# Patient Record
Sex: MALE | Race: OTHER | Employment: UNEMPLOYED | URBAN - METROPOLITAN AREA
[De-identification: names, ages, dates, MRNs, and addresses within clinical notes are randomized per-mention and may not be internally consistent; named-entity substitution may affect disease eponyms.]

---

## 2024-02-23 ENCOUNTER — HOSPITAL ENCOUNTER (EMERGENCY)
Facility: HOSPITAL | Age: 29
Discharge: HOME | End: 2024-02-23
Attending: EMERGENCY MEDICINE
Payer: COMMERCIAL

## 2024-02-23 VITALS
HEART RATE: 77 BPM | SYSTOLIC BLOOD PRESSURE: 139 MMHG | RESPIRATION RATE: 16 BRPM | OXYGEN SATURATION: 96 % | TEMPERATURE: 97 F | WEIGHT: 220 LBS | DIASTOLIC BLOOD PRESSURE: 77 MMHG | BODY MASS INDEX: 30.8 KG/M2 | HEIGHT: 71 IN

## 2024-02-23 DIAGNOSIS — K52.9 GASTROENTERITIS: Primary | ICD-10-CM

## 2024-02-23 DIAGNOSIS — R10.13 DYSPEPSIA: ICD-10-CM

## 2024-02-23 LAB
ALBUMIN SERPL-MCNC: 5.7 G/DL (ref 3.2–4.8)
ALP LIVER SERPL-CCNC: 58 U/L
ALT SERPL-CCNC: 39 U/L
ANION GAP SERPL CALC-SCNC: 11 MMOL/L (ref 0–18)
AST SERPL-CCNC: 31 U/L (ref ?–34)
BASOPHILS # BLD AUTO: 0.03 X10(3) UL (ref 0–0.2)
BASOPHILS NFR BLD AUTO: 0.2 %
BILIRUB DIRECT SERPL-MCNC: 0.4 MG/DL (ref ?–0.3)
BILIRUB SERPL-MCNC: 1.7 MG/DL (ref 0.3–1.2)
BUN BLD-MCNC: 15 MG/DL (ref 9–23)
BUN/CREAT SERPL: 14.9 (ref 10–20)
CALCIUM BLD-MCNC: 10.4 MG/DL (ref 8.7–10.4)
CHLORIDE SERPL-SCNC: 107 MMOL/L (ref 98–112)
CO2 SERPL-SCNC: 24 MMOL/L (ref 21–32)
CREAT BLD-MCNC: 1.01 MG/DL
DEPRECATED RDW RBC AUTO: 37.2 FL (ref 35.1–46.3)
EGFRCR SERPLBLD CKD-EPI 2021: 103 ML/MIN/1.73M2 (ref 60–?)
EOSINOPHIL # BLD AUTO: 0.02 X10(3) UL (ref 0–0.7)
EOSINOPHIL NFR BLD AUTO: 0.1 %
ERYTHROCYTE [DISTWIDTH] IN BLOOD BY AUTOMATED COUNT: 12.2 % (ref 11–15)
GLUCOSE BLD-MCNC: 116 MG/DL (ref 70–99)
HCT VFR BLD AUTO: 49.7 %
HGB BLD-MCNC: 17.1 G/DL
IMM GRANULOCYTES # BLD AUTO: 0.07 X10(3) UL (ref 0–1)
IMM GRANULOCYTES NFR BLD: 0.5 %
LIPASE SERPL-CCNC: 31 U/L (ref 13–75)
LYMPHOCYTES # BLD AUTO: 0.59 X10(3) UL (ref 1–4)
LYMPHOCYTES NFR BLD AUTO: 4 %
MCH RBC QN AUTO: 28.9 PG (ref 26–34)
MCHC RBC AUTO-ENTMCNC: 34.4 G/DL (ref 31–37)
MCV RBC AUTO: 84 FL
MONOCYTES # BLD AUTO: 0.61 X10(3) UL (ref 0.1–1)
MONOCYTES NFR BLD AUTO: 4.2 %
NEUTROPHILS # BLD AUTO: 13.28 X10 (3) UL (ref 1.5–7.7)
NEUTROPHILS # BLD AUTO: 13.28 X10(3) UL (ref 1.5–7.7)
NEUTROPHILS NFR BLD AUTO: 91 %
OSMOLALITY SERPL CALC.SUM OF ELEC: 296 MOSM/KG (ref 275–295)
PLATELET # BLD AUTO: 293 10(3)UL (ref 150–450)
POTASSIUM SERPL-SCNC: 3.7 MMOL/L (ref 3.5–5.1)
PROT SERPL-MCNC: 9.5 G/DL (ref 5.7–8.2)
RBC # BLD AUTO: 5.92 X10(6)UL
SODIUM SERPL-SCNC: 142 MMOL/L (ref 136–145)
WBC # BLD AUTO: 14.6 X10(3) UL (ref 4–11)

## 2024-02-23 PROCEDURE — 83690 ASSAY OF LIPASE: CPT

## 2024-02-23 PROCEDURE — 80076 HEPATIC FUNCTION PANEL: CPT | Performed by: EMERGENCY MEDICINE

## 2024-02-23 PROCEDURE — 85025 COMPLETE CBC W/AUTO DIFF WBC: CPT | Performed by: EMERGENCY MEDICINE

## 2024-02-23 PROCEDURE — S0028 INJECTION, FAMOTIDINE, 20 MG: HCPCS | Performed by: EMERGENCY MEDICINE

## 2024-02-23 PROCEDURE — 80048 BASIC METABOLIC PNL TOTAL CA: CPT | Performed by: EMERGENCY MEDICINE

## 2024-02-23 PROCEDURE — 80048 BASIC METABOLIC PNL TOTAL CA: CPT

## 2024-02-23 PROCEDURE — 99284 EMERGENCY DEPT VISIT MOD MDM: CPT

## 2024-02-23 PROCEDURE — 85025 COMPLETE CBC W/AUTO DIFF WBC: CPT

## 2024-02-23 PROCEDURE — 96374 THER/PROPH/DIAG INJ IV PUSH: CPT

## 2024-02-23 PROCEDURE — 96361 HYDRATE IV INFUSION ADD-ON: CPT

## 2024-02-23 PROCEDURE — 83690 ASSAY OF LIPASE: CPT | Performed by: EMERGENCY MEDICINE

## 2024-02-23 RX ORDER — ONDANSETRON 4 MG/1
4 TABLET, ORALLY DISINTEGRATING ORAL EVERY 8 HOURS PRN
Qty: 6 TABLET | Refills: 0 | Status: SHIPPED | OUTPATIENT
Start: 2024-02-23

## 2024-02-23 RX ORDER — FAMOTIDINE 20 MG/1
20 TABLET, FILM COATED ORAL DAILY
Qty: 7 TABLET | Refills: 0 | Status: SHIPPED | OUTPATIENT
Start: 2024-02-23 | End: 2024-03-01

## 2024-02-23 RX ORDER — FAMOTIDINE 10 MG/ML
20 INJECTION, SOLUTION INTRAVENOUS ONCE
Status: COMPLETED | OUTPATIENT
Start: 2024-02-23 | End: 2024-02-23

## 2024-02-23 NOTE — ED PROVIDER NOTES
Patient Seen in: [facility] Emergency Department      History     Chief Complaint   Patient presents with    Nausea/Vomiting/Diarrhea    Abdomen/Flank Pain     Stated Complaint: Vomiting, Diarrhea    Subjective:   HPI    28-year-old male healthy woke up this morning with some abdominal discomfort.  He initially had some what he thought was constipation and then developed several episodes of diarrhea.  He went to work and he got nauseated and now has had some vomiting.  No fever.  No abdominal surgical history.  No sick contacts.  No recent travel.    Objective:   Past Medical History:   Diagnosis Date    Asthma (HCC)               History reviewed. No pertinent surgical history.             Social History     Socioeconomic History    Marital status: Single   Tobacco Use    Smoking status: Never    Smokeless tobacco: Never   Vaping Use    Vaping Use: Never used   Substance and Sexual Activity    Alcohol use: Not Currently    Drug use: Not Currently              Review of Systems    Positive for stated complaint: Vomiting, Diarrhea  Other systems are as noted in HPI.  Constitutional and vital signs reviewed.      All other systems reviewed and negative except as noted above.    Physical Exam     ED Triage Vitals [02/23/24 1527]   /83   Pulse 86   Resp 21   Temp 97 °F (36.1 °C)   Temp src Temporal   SpO2 97 %   O2 Device None (Room air)       Current:/77   Pulse 77   Temp 97 °F (36.1 °C) (Temporal)   Resp 16   Ht 180.3 cm (5' 11\")   Wt 99.8 kg   SpO2 96%   BMI 30.68 kg/m²         Physical Exam    Constitutional: Oriented to person, place, and time.  Appears well-developed. No distress.   Head: Normocephalic and atraumatic.   Eyes: Conjunctivae are normal. Pupils are equal, round, and reactive to light.   Cardiovascular: Normal rate, regular rhythm and intact distal pulses.    Pulmonary/Chest: Effort normal. No respiratory distress.   Abdominal: Soft.  Mild epigastric tenderness.  No guarding  or peritoneal signs.  Musculoskeletal: Normal range of motion. No edema or tenderness.   Neurological: Alert and oriented to person, place, and time.   Skin: Skin is warm and dry.   Nursing note and vitals reviewed.    Differential diagnosis includes viral gastroenteritis, dyspepsia, gastroenteritis, pancreatitis.      ED Course     Labs Reviewed   BASIC METABOLIC PANEL (8) - Abnormal; Notable for the following components:       Result Value    Glucose 116 (*)     Calculated Osmolality 296 (*)     All other components within normal limits   HEPATIC FUNCTION PANEL (7) - Abnormal; Notable for the following components:    Bilirubin, Total 1.7 (*)     Bilirubin, Direct 0.4 (*)     Total Protein 9.5 (*)     Albumin 5.7 (*)     All other components within normal limits   CBC W/ DIFFERENTIAL - Abnormal; Notable for the following components:    WBC 14.6 (*)     RBC 5.92 (*)     Neutrophil Absolute Prelim 13.28 (*)     Neutrophil Absolute 13.28 (*)     Lymphocyte Absolute 0.59 (*)     All other components within normal limits   LIPASE - Normal   CBC WITH DIFFERENTIAL WITH PLATELET    Narrative:     The following orders were created for panel order CBC With Differential With Platelet.  Procedure                               Abnormality         Status                     ---------                               -----------         ------                     CBC W/ DIFFERENTIAL[[id number]]          Abnormal            Final result                 Please view results for these tests on the individual orders.                      MDM                                         Medical Decision Making  Patient clinically feeling much improved.  Vital stable.  Nonspecific likely reactive but probably associated white count elevation regarding his viral gastroenteritis.  Will treat symptomatically.  Encourage fluids.  Tylenol Motrin at home verbjorje.  He will follow-up in [place] over the weekend with any worsening or change.    Problems  Addressed:  Dyspepsia: acute illness or injury  Gastroenteritis: acute illness or injury with systemic symptoms    Amount and/or Complexity of Data Reviewed  Labs: ordered. Decision-making details documented in ED Course.    Risk  OTC drugs.  Prescription drug management.        Disposition and Plan     Clinical Impression:  1. Gastroenteritis    2. Dyspepsia         Disposition:  Discharge  2/23/2024  6:31 pm    Follow-up:  YOUR PRIMARY CARE DOCTOR    Call in 3 day(s)  As needed          Medications Prescribed:  Discharge Medication List as of 2/23/2024  6:32 PM        START taking these medications    Details   famotidine (PEPCID) 20 MG Oral Tab Take 1 tablet (20 mg total) by mouth daily for 7 days., Normal, Disp-7 tablet, R-0      ondansetron 4 MG Oral Tablet Dispersible Take 1 tablet (4 mg total) by mouth every 8 (eight) hours as needed for Nausea., Normal, Disp-6 tablet, R-0

## 2024-02-24 NOTE — ED QUICK NOTES
Pt a/ox4, respirations unlabored, speech full/clear, gait steady, no acute distress. All ED orders completed.   Pt instructed to return to ED for any new/severe s/s or as directed by provider, and to see PMD/specialist ASAP or as directed by provider.

## 2024-02-26 NOTE — CM/SW NOTE
Called patient at [phone] spoke with Emely who stated patient is much better.    Called patients cell number [phone] to check on patient, message left     Epifanio called back stating he is much better and he will make a follow up appointment with his pcp

## 2025-02-02 NOTE — DISCHARGE INSTRUCTIONS
Take the muscle relaxer as needed for for the musculoskeletal back pain will cause drowsiness no driving or operating machinery while taking this medication.  Continue over-the-counter ibuprofen and Tylenol for pain.  Continue over-the-counter lidocaine patch or IcyHot.  You should receive a phone call from the spine center to set up an appointment you may also follow-up with primary care provider resource provided.  If you develop worsening back pain or pain location you did not initially have unintentionally urinate or have a bowel movement on yourself wake up cannot feel your lower extremities or ambulate or any new or worsening symptoms go to the nearest emergency department.

## 2025-02-02 NOTE — ED PROVIDER NOTES
Patient Seen in: [facility]      History     Chief Complaint   Patient presents with    Back Pain     Stated Complaint: Back Pain    Subjective:   HPI      This is a 29-year-old male with a history of asthma presenting with lower back pain.  Patient states since Thursday he has been dealing with lower back pain worse on Friday saw the chiropractor yesterday who did some cupping that seem to help but it still feels uncomfortable.  Patient states he thinks he might of hurt his self while lifting he does do some heavy lifting pushing and pulling for work but did not fall or land on his back.  Denies any urinary symptoms any bowel or bladder incontinence or saddle paresthesia.  Patient states he has had similar back pain like this several years ago in the past and it was just muscle.    Objective:     Past Medical History:    Asthma (HCC)              History reviewed. No pertinent surgical history.             Social History     Socioeconomic History    Marital status: Single   Tobacco Use    Smoking status: Never    Smokeless tobacco: Never   Vaping Use    Vaping status: Never Used   Substance and Sexual Activity    Alcohol use: Not Currently    Drug use: Not Currently              Review of Systems    Positive for stated complaint: Back Pain  Other systems are as noted in HPI.  Constitutional and vital signs reviewed.      All other systems reviewed and negative except as noted above.    Physical Exam     ED Triage Vitals [02/02/25 0842]   /68   Pulse 78   Resp 18   Temp 98.1 °F (36.7 °C)   Temp src Oral   SpO2 97 %   O2 Device None (Room air)       Current Vitals:   Vital Signs  BP: 128/68  Pulse: 78  Resp: 18  Temp: 98.1 °F (36.7 °C)  Temp src: Oral    Oxygen Therapy  SpO2: 97 %  O2 Device: None (Room air)        Physical Exam  Vitals and nursing note reviewed.   Constitutional:       Appearance: Normal appearance.   HENT:      Right Ear: External ear normal.      Left Ear: External ear normal.       Nose: Nose normal.      Mouth/Throat:      Mouth: Mucous membranes are moist.      Pharynx: Oropharynx is clear.   Eyes:      Conjunctiva/sclera: Conjunctivae normal.   Abdominal:      Palpations: Abdomen is soft.      Tenderness: There is no abdominal tenderness. There is no right CVA tenderness or left CVA tenderness.   Musculoskeletal:         General: Normal range of motion.      Cervical back: Normal range of motion.      Lumbar back: Negative right straight leg raise test and negative left straight leg raise test.        Back:       Comments: Cervical thoracic lumbar spine no midline TTP or step-offs   Skin:     General: Skin is warm.      Capillary Refill: Capillary refill takes less than 2 seconds.   Neurological:      General: No focal deficit present.      Mental Status: He is alert and oriented to person, place, and time.         ED Course   Labs Reviewed - No data to display  Final result by Lavelle Rizzo MD (02/02/25 09:55:10)                Impression:    CONCLUSION:  No fracture, dislocation or significant spondylotic changes.           Dictated by (CST): Lavelle Rizzo MD on 2/02/2025 at 9:54 AM      Finalized by (CST): Lavelle Rizzo MD on 2/02/2025 at 9:55 AM                  Narrative:    PROCEDURE: XR LUMBAR SPINE (MIN 2 VIEWS) (CPT=72100)     COMPARISON: None.     INDICATIONS: Acute right-sided lower back pain post lifting injury.  No reported sciatica.     TECHNIQUE: Lumbar spine radiographs (2-3 views)       FINDINGS:     ALIGNMENT: Normal alignment.    VERTEBRAL BODIES:   Normal.  No fracture or bony lesion.  DISC SPACES: Normal.  No significant disc space narrowing.  SACROILIAC JOINTS: Normal.    OTHER: Negative.                                        MDM           Medical Decision Making  29-year-old male well-appearing and nontoxic presenting with low back pain.  DDx lumbar strain versus acute on chronic low back pain versus low back pain with sciatica.  No clinical  indication for labs urine or advanced imaging but a lumbar spine x-ray will be ordered Toradol be given for pain and lidocaine patch will be applied anticipate home with referral to the spine center Flexeril and continue over-the-counter supportive therapy measures.  Patient is agreeable with the plan of care.    X-ray independently viewed by this provider no fracture noted    Discussed results with the patient.  Discussed likely lumbar strain or acute low back pain discussed treatment with Flexeril and side effects of this medication discussed over-the-counter NSAIDs to start after 6 PM as he did receive Toradol here in [place] may continue Tylenol and over-the-counter lidocaine patch or IcyHot or alternate heat or ice discussed strict ER precautions with any new or worsening symptoms outpatient follow-up with PCP and that the spine center should contact him to set up an appointment.  Patient feels comfortable with the plan of care and acknowledges understanding discharge instructions.    Problems Addressed:  Acute right-sided low back pain without sciatica: acute illness or injury    Amount and/or Complexity of Data Reviewed  Radiology: ordered and independent interpretation performed. Decision-making details documented in ED Course.    Risk  OTC drugs.  Prescription drug management.        Disposition and Plan     Clinical Impression:  1. Acute right-sided low back pain without sciatica         Disposition:  Discharge  2/2/2025  9:59 am    Follow-up:  Rey Sheridan MD  [address] [place] [address]  [phone]      Resource for primary care doctor to follow-up with call to set up an appointment          Medications Prescribed:  Discharge Medication List as of 2/2/2025  9:59 AM        START taking these medications    Details   cyclobenzaprine 10 MG Oral Tab Take 1 tablet (10 mg total) by mouth 3 (three) times daily as needed for Muscle spasms (May cause drowsiness no driving or operating  machinery while taking this medication)., Normal, Disp-10 tablet, R-0                 Supplementary Documentation:

## 2025-02-03 NOTE — PROGRESS NOTES
Spine Center Referral Navigation Encounter Note    Referred by: ANAHI Valdez    Imaging: XR Lumbar Spine   If imaging done at an external facility, instructed patient to bring disc of MRI to appointment.     Previously Seen Spine Care Providers: None    Referred to: Mark Duarte MD in Physiatry     Information below is patient reported.     [facility]  Are you currently experiencing any of the following symptoms?      No    Is your condition due to an injury that occurred at work or is your care for this condition being coordinated by Worker's Compensation?      No    Are you looking for a second surgical opinion?      No    Have you had surgery on your spine (neck or back) in the last 12 months?      No    In the last several weeks, have you experienced weakness or balance issues that have caused falls or difficulty lifting your legs or feet (lower body) and/or weakness that has caused you to drop items or caused changes in handwriting (upper body)?      No    In the last 3 months, have you had spine injections AND physical therapy for your back or neck that did not improve your symptoms?      No    : Patient needs to be seen by non-surgical team. Does patient require a new visit or established visit?      New patient visit    Are you closer to [place] or [facility]?      [facility]         2/3- Spoke to patient and was able to schedule for  2/10 at 5:00 pm. Information given verbally to patient as he stated he does not use [employer].

## 2025-02-13 NOTE — TELEPHONE ENCOUNTER
No-show letter sent on 2/13/25 for appt missed on 2/10/25.Sent via [employer] / [employer].

## (undated) NOTE — LETTER
Date & Time: 2/2/2025, 9:59 AM  Patient: Epifanio Bey  Encounter Provider(s):    Manisha Kelly APRN       To Whom It May Concern:    Epifanio Bey was seen and treated in our department on 2/2/2025. He should not return to work until 02/05/2025 but may return sooner if feeling better .    If you have any questions or concerns, please do not hesitate to call.  MAHSA Valdez      _____________________________  Physician/APC Signature